# Patient Record
Sex: FEMALE
[De-identification: names, ages, dates, MRNs, and addresses within clinical notes are randomized per-mention and may not be internally consistent; named-entity substitution may affect disease eponyms.]

---

## 2017-12-18 NOTE — ED PDOC
Arrival/HPI





- General


Time Seen by Provider: 12/18/17 13:34


Historian: Patient





- History of Present Illness


Narrative History of Present Illness (Text): 





12/18/17 13:38


66yo female with Past medical history  of hypertension and vertigo biba for 

2days history of persistent dizziness. States she took her medication, but 

still dizzy. States she hears a "cracking" noise in her ears, whenever she 

bites on food. Also note rhinorrhea for 2days. Denies fever, chills, cough, 

chest pain, focal weakness, nausea, vomiting, sick contact, any other complaint.











Past Medical History





- Provider Review


Nursing Documentation Reviewed: Yes





- Infectious Disease


Hx of Infectious Diseases: None





- Tetanus Immunization


Tetanus Immunization: Unknown





- Past Medical History


Past Medical History: Non-Contributing





- Cardiac


Hx Pacemaker: No





- Neurological


Hx Paralysis: No





- HEENT


Other/Comment: missing teeth and front bottom right tooth loose





- Hematological/Oncological


Hx Blood Transfusions: No


Hx Blood Transfusion Reaction: No





- Integumentary


Other/Comment: multiple discoloration to ble





- Musculoskeletal/Rheumatological


Hx Musculoskeletal Disorders: No





- Psychiatric


Hx Emotional Abuse: No


Hx Physical Abuse: No


Hx Substance Use: No





- Past Surgical History


Past Surgical History: Non-Contributing





- Surgical History


Hx Inguinal Hernia Repair: Yes (umbilical hernia repair)


Other/Comment: stitches in chin left knee head from various childhood accidents.





- Anesthesia


Hx Anesthesia Reactions: No


Hx Malignant Hyperthermia: No





- Suicidal Assessment


Feels Threatened In Home Enviroment: No





Family/Social History





- Physician Review


Nursing Documentation Reviewed: Yes


Family/Social History: Unknown Family HX


Smoking Status: Never Smoked


Hx Alcohol Use: No


Hx Substance Use: No


Hx Substance Use Treatment: No





Allergies/Home Meds


Allergies/Adverse Reactions: 


Allergies





No Known Allergies Allergy (Verified 03/15/16 09:38)


 








Home Medications: 


 Home Meds











 Medication  Instructions  Recorded  Confirmed


 


Amlodipine Besylate [Norvasc] 5 mg PO DAILY 06/03/15 12/18/17


 


Meclizine HCl [Antivert/25] 25 mg PO TID 06/13/16 12/18/17














Review of Systems





- Physician Review


All systems were reviewed & negative as marked: Yes





- Review of Systems


Constitutional: Normal


Eyes: Normal


ENT: Rhinorrhea


Respiratory: Normal


Cardiovascular: Normal


Gastrointestinal: Normal


Genitourinary Female: Normal


Musculoskeletal: Normal


Skin: Normal


Neurological: Dizziness.  absent: Headache, Focal Weakness, Speech Changes, 

Facial Droop


Endocrine: Normal


Hemo/Lymphatic: Normal


Psychiatric: Normal





Physical Exam


Vital Signs Reviewed: Yes


Vital Signs











  Temp Pulse Resp BP Pulse Ox


 


 12/18/17 17:48   75  24  149/70  97


 


 12/18/17 13:28  98.1 F  68  18  157/75 H  98











Temperature: Afebrile


Blood Pressure: Normal


Pulse: Regular


Respiratory Rate: Normal


Appearance: Positive for: Well-Appearing, Non-Toxic, Comfortable


Pain Distress: None


Mental Status: Positive for: Alert and Oriented X 3





- Systems Exam


Head: Present: Atraumatic, Normocephalic


Pupils: Present: PERRL


Extroacular Muscles: Present: EOMI


Conjunctiva: Present: Normal


Mouth: Present: Moist Mucous Membranes


Neck: Present: Normal Range of Motion


Respiratory/Chest: Present: Clear to Auscultation, Good Air Exchange.  No: 

Respiratory Distress, Accessory Muscle Use


Cardiovascular: Present: Regular Rate and Rhythm, Normal S1, S2.  No: Murmurs


Abdomen: Present: Normal Bowel Sounds.  No: Tenderness, Distention, Peritoneal 

Signs


Back: Present: Normal Inspection


Upper Extremity: Present: Normal Inspection.  No: Cyanosis, Edema


Lower Extremity: Present: Normal Inspection.  No: Edema


Neurological: Present: GCS=15, CN II-XII Intact, Speech Normal, Motor Func 

Grossly Intact, Normal Sensory Function, Normal Cerebellar Funct, Norm Deep 

Tendon Reflexes, Gait Normal, Memory Normal, Normal 2Pt Descrimination, Other (

No focal neurological deficit)


Skin: Present: Warm, Dry, Normal Color.  No: Rashes


Psychiatric: Present: Alert, Oriented x 3, Normal Insight, Normal Concentration





Medical Decision Making


ED Course and Treatment: 





12/18/17 17:03


PT presented for stated history. she was neurologically stable in Emergency 

department. Lab was unremarkable.





Head CT ordered





EKG Sinus rhythm with fusion complexes @75bpm





Pt however continue to complain of dizziness. She however took Meclizine PTA. 

Mecline was ordered in Emergency department.





Case was DW Dr. Lu and she was placed on Tele OBS.





Head CT - 


IMPRESSION:


No acute intracranial hemorrhage or large acute infarct so far as can be seen. 





Suspect minimal chronic periventricular white matter ischemic changes





Moderate generalized volume loss.











- Lab Interpretations


Lab Results: 








 12/18/17 14:05 





 12/18/17 14:05 





 Lab Results





12/18/17 16:15: Urine Color Yellow, Urine Appearance Clear, Urine pH 7.0, Ur 

Specific Gravity 1.015, Urine Protein Negative, Urine Glucose (UA) Negative, 

Urine Ketones Trace H, Urine Blood Negative, Urine Nitrate Negative, Urine 

Bilirubin Negative, Urine Urobilinogen 0.2, Ur Leukocyte Esterase Negative


12/18/17 14:05: Sodium 140, Potassium 4.0, Chloride 107, Carbon Dioxide 23, 

Anion Gap 14, BUN 12, Creatinine 0.6 L, Est GFR ( Amer) > 60, Est GFR (

Non-Af Amer) > 60, Random Glucose 95, Calcium 9.4, Total Bilirubin 0.7, AST 29, 

ALT 46, Alkaline Phosphatase 45, Lactate Dehydrogenase 611, Total Creatine 

Kinase 95, Troponin I < 0.01, Total Protein 6.8, Albumin 4.1, Globulin 2.8, 

Albumin/Globulin Ratio 1.5


12/18/17 14:05: PT 11.1, INR 1.02, APTT 31.1


12/18/17 14:05: WBC 4.9, RBC 5.09, Hgb 14.2, Hct 43.3, MCV 85.1, MCH 27.9, MCHC 

32.8, RDW 13.8, Plt Count 189, MPV 12.1 H, Gran % 48.4 L, Lymph % (Auto) 41.8 H

, Mono % (Auto) 6.1 H, Eos % (Auto) 2.9, Baso % (Auto) 0.8, Gran # 2.37, Lymph 

# 2.1, Mono # 0.3, Eos # 0.1, Baso # 0.04











- RAD Interpretation


Radiology Orders: 








12/18/17 16:33


HEAD W/O CONTRAST [CT] Stat 














- Medication Orders


Current Medication Orders: 








Amlodipine Besylate (Norvasc)  5 mg PO DAILY RIVAS


Sodium Chloride (Sodium Chloride 0.45%)  1,000 mls @ 60 mls/hr IV .[id number] RIVAS


Meclizine HCl (Antivert)  25 mg PO TID RIVAS





Discontinued Medications





Sodium Chloride (Sodium Chloride 0.9%)  1,000 mls @ 100 mls/hr IV .Q10H STA


   Stop: 12/19/17 02:38


   Last Admin: 12/18/17 17:00  Dose: 100 mls/hr





eMAR Start Stop


 Document     12/18/17 17:00  RG  (Rec: 12/18/17 17:37  RG  [id number])


     Intravenous Solution


      Start Date                                 12/18/17


      Start Time                                 17:00


      End Date                                   12/18/17





Meclizine HCl (Antivert)  25 mg PO STAT STA


   Stop: 12/18/17 16:49


   Last Admin: 12/18/17 17:00  Dose: 25 mg











Disposition/Present on Arrival





- Present on Arrival


Any Indicators Present on Arrival: No


History of DVT/PE: No


History of Uncontrolled Diabetes: No


Urinary Catheter: No


History Surgical Site Infection Following: None





- Disposition


Have Diagnosis and Disposition been Completed?: Yes


Diagnosis: 


 Near syncope





Disposition: HOSPITALIZED


Disposition Time: 16:35


Patient Problems: 


 Current Active Problems











Problem Status Onset


 


Near syncope Acute  











Condition: FAIR

## 2017-12-18 NOTE — CT
PROCEDURE:  CT HEAD WITHOUT CONTRAST.



HISTORY:

Dizziness. 



COMPARISON:

None available. Comparison made with prior CT scan of the brain dated 

08/31/2015. 



TECHNIQUE:

Axial computed tomography images were obtained through the head/brain 

without intravenous contrast.  



Radiation dose:



Total exam DLP = 629.69 mGy-cm.



This CT exam was performed using one or more of the following dose 

reduction techniques: Automated exposure control, adjustment of the 

mA and/or kV according to patient size, and/or use of iterative 

reconstruction technique.



FINDINGS:



HEMORRHAGE:

No acute parenchymal, subarachnoid nor extra-axial hemorrhage. 



BRAIN:

. No evidence of large acute infarct however suspect minimal chronic 

periventricular white matter ischemic changes. . 



No obvious parenchymal nor extra-axial masses or collections 

identified on this noncontrast study. 



Moderate generalized volume loss. 



VENTRICLES:

No evidence of obstructive hydrocephalus. 



CALVARIUM:

There are no acute calvarial fractures.



PARANASAL SINUSES:

Visualized paranasal sinuses are well-developed and currently 

well-aerated. 



MASTOID AIR CELLS:

Mastoid air complexes are well-developed and currently well-aerated.



OTHER FINDINGS:

None.



IMPRESSION:

No acute intracranial hemorrhage or large acute infarct so far as can 

be seen. 



Suspect minimal chronic periventricular white matter ischemic changes



Moderate generalized volume loss.

## 2017-12-19 NOTE — US
PROCEDURE:  Bilateral carotid artery duplex ultrasound 



HISTORY:

Carotid stenosis dizziness



PHYSICIAN(S):  David Barr MD.



TECHNIQUE:

Duplex sonography and color-flow Doppler were used to evaluate the 

carotid bifurcations and limited segments of the vertebral arteries 

bilaterally. The exam is limited by body habitus and tortuous vessels 



FINDINGS:

There is mild smooth heterogeneous plaque noted at the carotid 

bifurcations bilaterally. The peak systolic velocity in the proximal 

right internal carotid artery is 68 cm/sec. This corresponds to a 20 

to 39% proximal right ICA stenosis. Normal systolic velocities are 

noted in the proximal right external carotid artery. There is 

antegrade flow in the right vertebral artery.



The peak systolic velocity in the proximal left internal carotid 

artery is 70 cm/sec. This corresponds to a 20 to 39% proximal left 

ICA stenosis. Normal systolic velocities are noted in the proximal 

left external carotid artery. There is antegrade flow in the left 

vertebral artery.



IMPRESSION:

1. Bilateral 20-39% proximal ICA stenoses.



2. Antegrade flow in both vertebral arteries.

## 2017-12-19 NOTE — MRI
PROCEDURE:  MRI BRAIN WITHOUT CONTRAST



HISTORY:

dizzy



COMPARISON:

Comparison is made with previous study dated 08/31/2015 



TECHNIQUE:

Multiplanar, multisequence MR images of the brain were obtained 

without intravenous contrast enhancement.



FINDINGS:



HEMORRHAGE:

None



DWI:

No evidence of an acute or early subacute infarction.



BRAIN PARENCHYMA:

No mass effect or edema. No atrophy or chronic microvascular ischemic 

changes.



VENTRICLES:

Unremarkable. No hydrocephalus.



CRANIUM:

Unremarkable.



ORBITS:

Grossly unremarkable.



PARANASAL SINUSES/MASTOIDS:

Clear



VASCULAR SYSTEM:

Skull base flow voids intact.



OTHER FINDINGS:

None. 



IMPRESSION:

Unremarkable non contrast enhanced MRI of the brain. 



No significant interval change noted since the previous study.

## 2017-12-19 NOTE — HP
HISTORY OF PRESENT ILLNESS:  I was called on to the emergency room to see

Rylee.  She has been having dizziness for about 2 days now, she is wobbly,

she tells me, it looks like she is drunk.  She is hearing a cracking noise

in her ears  whenever she bites foods, some rhinorrhea for 2 days. 

She feels drunk when she walks, dizzy, like she is going to fall down.  She

is on Antivert for vertigo already and she has hypertension, started

medication for that too.  A 67-year-old female with dizziness for 2 days,

it will not go away.  She is on medicine for dizziness and for

hypertension.  She just cannot walk straight, feels like she is going to

fall.  No pacemaker.  She has missing teeth from front and bottom tooth. 

Discoloration of both lower extremities.  She had inguinal hernia repair,

stitches in her chin, left knee, various childhood accidents led to

scarring.



FAMILY HISTORY:  Unknown family history.



SOCIAL HISTORY:  No smoker, no drinking, no drugs.



ALLERGIES:  NO KNOWN DRUG ALLERGIES.



MEDICATIONS:  She takes Antivert for dizzy and Norvasc 5 for the blood

pressure.



REVIEW OF SYSTEMS:  No acute vision or hearing changes.  No sore throat, no

neck pain.  She is dizzy, wobbly.  She turns her head too fast.  If she

walks, she feels like she is walking drunk.  No chest pain or palpitations.

No shortness of breath, coughing or wheezing.  No nausea, vomiting,

constipation or diarrhea.  No abdominal pain.  No problems urinating.  No

pain to the legs or arms.  Skin from what she can tell is intact.  No

ulcers or rashes.  She is completely dizzy.  No headache.  No focal

weakness.  No swelling.  Not depressed or anxious.



PHYSICAL EXAMINATION:

GENERAL:  Well appearing, nontoxic, and at rest, alert and oriented x3.

VITAL SIGNS:  She has a 98.1 temperature, 68 pulse, 18 respiratory rate,

157/75 blood pressure, and 98% O2 saturation on room air.

HEENT:  Head is atraumatic and normocephalic.  Extraocular muscles are

intact.  Pupils are equal and reactive to light and accommodation.  Throat

is moist.

NECK:  Supple.

HEART:  Regular rate.  Normal S1 and S2.

LUNGS:  Clear to auscultation bilaterally.  No wheezing.  No rhonchi.  No

rales.

ABDOMEN:  Soft and nontender.  Positive bowel sounds.  No guarding or

rebound.  No CVA tenderness.

EXTREMITIES:  No edema.  There is some discoloration on legs.  She can move

all 4 extremities.

NEUROLOGIC:  GCS is 15.  Cranial nerves II through XII grossly intact. 

Normal speech.  Alert and oriented x3.

SKIN:  Warm and dry.  No apparent rashes or ulcers.

ENDOCRINE:  Thyroid midline.  No palpable appreciable adenopathy.



LABORATORY DATA:  She had lots of tests.  She has urine that is clear. 

Sodium 144, potassium 4, BUN 12, creatinine 0.6, GFR is greater than 60,

sugar is 95, calcium 9.4, total bilirubin is 0.78, AST is 29, ALT is 46,

alk phos is 45, lactic dehydrogenase is 611, total creatine kinase is 95,

troponin I is less than 0.01.  Total protein is 6.0, albumin is 4.1,

globulin 2.8.  INR is 1.02.  White count 4.9, hemoglobin 14.2, hematocrit

42.3, platelets of 189.



IMPRESSION AND PLAN:  She had a CAT scan of her head.  It showed no acute

intracranial hemorrhage or large acute infarct can be seen.  Possible

minimal chronic periventricular white matter ischemic changes.  She is

going to have a consult with Neurology.  She is going to have IV fluids,

meclizine, amlodipine, carotid Doppler, brain MRI, physical therapy,and we

will see how she does.  She is in observation status.  She is here for

acute dizziness that will not be reversed.  She feels very wobbly.





__________________________________________

Kade Lu DO





DD:  12/18/2017 17:56:44

DT:  12/18/2017 23:30:21

Job # [id number]







MTDD

## 2017-12-19 NOTE — CARD
--------------- APPROVED REPORT --------------





EKG Measurement

Heart [id number]

FL [id number]

[id number]

[id number]

[id number]



<Conclusion>

Sinus rhythm 

No change

## 2017-12-19 NOTE — CON
DATE:  12/19/2017



NEUROLOGIC CONSULTATION



CHIEF COMPLAINT:  Dizziness.



HISTORY OF PRESENT ILLNESS:  A 67-year-old woman, past medical history of

hypertension, vertigo who came in with 2 days persistent dizziness and

_____ with tinnitus and finally found nauseous as well as rhinorrhea for 2

days.  She underwent MRI of the brain, which showed no intracranial

abnormality.  Carotid Doppler showed just 20% to 39% proximal ICA stenosis.

She is doing much better after she get Antivert in the hospital, has some

hydration.  Blood pressure was stable.  She was discharged home.



PAST MEDICAL HISTORY:  History of hypertension and vertigo.



FAMILY HISTORY:  Noncontributory.



MEDICATIONS:  Reviewed by nurse per reconciliation sheet.



SOCIAL HISTORY:  No illicit drug use, smoking, or EtOH abuse.



REVIEW OF SYSTEMS:  A 14-point review of systems is negative except as per

the HPI.



PHYSICAL EXAMINATION:

VITAL SIGNS:  Temperature 96.6, pulse rate 76, blood pressure 118/74,

respiratory rate 17.

GENERAL:  The patient is seen up in bed, in no acute distress.

HEENT:  Head is atraumatic and normocephalic.  PERRLA.  Extraocular muscles

are intact.

NECK:  Supple.  No JVD.

LUNGS:  Clear to auscultation.  No adventitious sounds.

HEART:  S1 and S2.  Normal rate and rhythm.  No murmurs, rubs, or gallops.

ABDOMEN:  Soft, nontender and nondistended.  Bowel sounds are present.

EXTREMITIES:  No clubbing.  No cyanosis.  Peripheral pulses are 2+ felt

bilaterally.

NEUROLOGIC:  The patient is alert and oriented to person, place, month, and

year.  Speech fluent without errors.  Cranial nerves II through XII are

intact.  Motor exam:  Moves all extremities equally.  Toes are downgoing

bilaterally.  Sensory exam:  Light touch, pinprick, proprioception,

vibration intact.  Coordination of finger-to-nose intact.  Gait is guarded.



LABORATORY DATA:  CMP and CBC is unremarkable.



ASSESSMENT AND PLAN:  This is a 67-year-old women with history of

hypertension and vertigo who presents with persistent dizziness, tinnitus,

some rhinorrhea for last few days.  MRI of the brain showed no acute

intracranial abnormality.  Carotid Doppler showed 20% to 39% proximal ICA

stenosis.  Her dizziness is more of positional vertigo.  At this time, we

would recommend:

1.  Avoid sudden movements.

2.  Vestibular therapy as an outpatient.

3.  Salt restriction diet and meclizine 25 mg p.o. t.i.d.

4.  Follow up in our neurologic office.  She is clinically stable from my

standpoint.





__________________________________________

Jomar Patton MD





DD:  12/19/2017 18:05:29

DT:  12/19/2017 19:09:05

Job # [id number]

## 2017-12-20 NOTE — DS
HISTORY OF PRESENT ILLNESS:  I am hoping to discharge her later on this

afternoon.  She is currently on IV fluids, meclizine and Norvasc.  She came

in dizzy.  As for this morning, was sitting up, eating breakfast, she is

not as dizzy.



PHYSICAL EXAMINATION:

VITAL SIGNS:  97.3 temperature, 80 pulse, 124/76 blood pressure, 18

respiratory rate, 96% O2 sat on room air.

HEENT:  Head atraumatic, normocephalic.  Throat is moist.

NECK: Supple.

HEART:  Regular rate.

LUNGS:  Clear to auscultation.

ABDOMEN:  Soft, obese.

EXTREMITIES:  No edema.



MEDICATIONS:  She is currently on Antivert, Norvasc and IV fluids.



LABORATORY DATA:  She has a 5.4 white count, 13.5 hemoglobin, 41.7

hematocrit, 186 platelets.  She has 139 sodium, potassium 3.7, BUN is 14,

creatinine is 0.7, GFR is greater than 60, sugar is 95, calcium is 8.9,

total bilirubin is 0.6, AST is 30, ALT is 30, alkaline phosphatase 39,

total protein 6.7.  Troponin I is less than 0.01.  Urine was good.



IMPRESSION:  She has a consult with Neurology, going to see what they want

to add.  Waiting for the brain MRI to come back and the carotid ultrasound.

Hopefully we will discharge her later this afternoon.





__________________________________________

Kade Lu DO





DD:  12/19/2017 9:37:26

DT:  12/19/2017 23:34:30

Job # [id number]

## 2018-01-03 NOTE — ED PDOC
Arrival/HPI





- General


Chief Complaint: Dizziness/Lightheaded


Time Seen by Provider: 01/03/18 08:53


Historian: Patient





- History of Present Illness


Narrative History of Present Illness (Text): 





01/03/18 09:00





Rylee Ivory is a 67 year old female who presents to the emergency department 

complaining of chronic dizziness for many years. Recent CT scan and MRI have 

been completed. Patient is follows by neurology and has an ENT appointment next 

week. Patient endorses that she is currently on Meclizine. No other complaints 

presented at this time.





Symptom Onset: Gradual


Symptom Course: Unchanged, Intermittent


Activities at Onset: Light


Context: Home





Past Medical History





- Provider Review


Nursing Documentation Reviewed: Yes





- Infectious Disease


Hx of Infectious Diseases: None





- Tetanus Immunization


Tetanus Immunization: Unknown





- Past Medical History


Past Medical History: Non-Contributing





- Cardiac


Hx Cardiac Disorders: Yes


Hx Hypertension: Yes





- Pulmonary


Hx Respiratory Disorders: Yes


Hx Emphysema: Yes





- Neurological


Hx Neurological Disorder: Yes


Hx Dizziness: Yes


Hx Transient Ischemic Attacks (TIA): Yes





- HEENT


Hx HEENT Disorder: No


Other/Comment: missing teeth and front bottom right tooth loose





- Renal


Hx Renal Disorder: No





- Endocrine/Metabolic


Hx Endocrine Disorders: No





- Hematological/Oncological


Hx Blood Disorders: No





- Integumentary


Hx Dermatological Disorder: Yes





- Musculoskeletal/Rheumatological


Hx Falls: No





- Gastrointestinal


Hx Gastrointestinal Disorders: No





- Genitourinary/Gynecological


Hx Genitourinary Disorders: No





- Psychiatric


Hx Psychophysiologic Disorder: No


Hx Emotional Abuse: No


Hx Physical Abuse: No


Hx Substance Use: No





- Past Surgical History


Past Surgical History: Non-Contributing





- Surgical History


Other/Comment: stitches in chin left knee head from various childhood accidents.





- Anesthesia


Hx Anesthesia Reactions: No


Hx Malignant Hyperthermia: No





- Suicidal Assessment


Feels Threatened In Home Enviroment: No





Family/Social History





- Physician Review


Nursing Documentation Reviewed: Yes


Family/Social History: No Known Family HX


Smoking Status: Never Smoked


Hx Alcohol Use: No


Hx Substance Use: No


Hx Substance Use Treatment: No





Allergies/Home Meds


Allergies/Adverse Reactions: 


Allergies





No Known Allergies Allergy (Verified 01/03/18 08:53)


 








Home Medications: 


 Home Meds











 Medication  Instructions  Recorded  Confirmed


 


Amlodipine Besylate [Norvasc] 5 mg PO DAILY 06/03/15 01/03/18


 


Meclizine HCl [Antivert/25] 25 mg PO TID 06/13/16 01/03/18














Review of Systems





- Physician Review


All systems were reviewed & negative as marked: Yes





- Review of Systems


Constitutional: absent: Fevers, Night Sweats


Eyes: absent: Vision Changes


ENT: absent: Hearing Changes


Respiratory: absent: SOB, Cough


Cardiovascular: absent: Chest Pain


Gastrointestinal: absent: Abdominal Pain


Genitourinary Female: absent: Dysuria, Frequency


Musculoskeletal: absent: Arthralgias, Back Pain


Skin: absent: Rash, Pruritis


Neurological: Dizziness


Endocrine: absent: Diaphoresis


Hemo/Lymphatic: absent: Adenopathy


Psychiatric: absent: Anxiety, Depression





Physical Exam


Vital Signs Reviewed: Yes


Vital Signs











  Temp Pulse Resp BP Pulse Ox


 


 01/03/18 11:31   81  16  136/76  98


 


 01/03/18 09:41  98.0 F  71  18  148/76  97


 


 01/03/18 08:50  98 F  79  18  139/84  98











Temperature: Afebrile


Blood Pressure: Normal


Pulse: Regular


Respiratory Rate: Normal


Appearance: Positive for: Well-Appearing, Non-Toxic, Comfortable


Pain Distress: None


Mental Status: Positive for: Alert and Oriented X 3





- Systems Exam


Head: Present: Atraumatic, Normocephalic


Pupils: Present: PERRL


Extroacular Muscles: Present: EOMI


Conjunctiva: Present: Normal


Mouth: Present: Moist Mucous Membranes


Neck: Present: Normal Range of Motion


Respiratory/Chest: Present: Clear to Auscultation, Good Air Exchange.  No: 

Respiratory Distress, Accessory Muscle Use


Cardiovascular: Present: Regular Rate and Rhythm, Normal S1, S2.  No: Murmurs


Abdomen: Present: Normal Bowel Sounds.  No: Tenderness, Distention, Peritoneal 

Signs


Back: Present: Normal Inspection


Upper Extremity: Present: Normal Inspection.  No: Cyanosis, Edema


Lower Extremity: Present: Normal Inspection.  No: Edema


Neurological: Present: GCS=15, CN II-XII Intact, Speech Normal


Skin: Present: Warm, Dry, Normal Color.  No: Rashes


Psychiatric: Present: Alert, Oriented x 3, Normal Insight, Normal Concentration





Medical Decision Making


ED Course and Treatment: 





01/03/18 08:55


Impression:





Plan:


-- Reglan and IV fluids


-- Labs


-- Reassess and disposition





Prior Visits:


Notes and results from previous visits were reviewed. Patient was last seen in 

the emergency department on 12/18/17 for 2days history of persistent dizziness. 

Patient was discharged home. 





Progress Notes:








- Lab Interpretations


Lab Results: 








 01/03/18 09:30 





 01/03/18 09:30 





 Lab Results





01/03/18 09:30: Sodium 143, Potassium 4.0, Chloride 108 H, Carbon Dioxide 24, 

Anion Gap 14, BUN 10, Creatinine 0.5 L, Est GFR ( Amer) > 60, Est GFR (

Non-Af Amer) > 60, Random Glucose 93, Calcium 9.2, Total Bilirubin 0.5, AST 28, 

ALT 33, Alkaline Phosphatase 39, Total Protein 7.0, Albumin 3.8, Globulin 3.2, 

Albumin/Globulin Ratio 1.2


01/03/18 09:30: WBC 5.4, RBC 5.14, Hgb 14.6, Hct 43.7, MCV 85.0, MCH 28.4, MCHC 

33.4, RDW 13.9, Plt Count 163, MPV 12.4 H, Gran % 49.0 L, Lymph % (Auto) 39.0 H

, Mono % (Auto) 8.7 H, Eos % (Auto) 2.6, Baso % (Auto) 0.7, Gran # 2.65, Lymph 

# 2.1, Mono # 0.5, Eos # 0.1, Baso # 0.04








I have reviewed the lab results: Yes





- Medication Orders


Current Medication Orders: 











Discontinued Medications





Sodium Chloride (Sodium Chloride 0.9%)  1,000 mls @ 100 mls/hr IV .Q10H RIVAS


   Last Admin: 01/03/18 09:45  Dose: 100 mls/hr





eMAR Start Stop


 Document     01/03/18 09:45  [facility]  (Rec: 01/03/18 09:45  [facility]  formerly [facility])


     Intravenous Solution


      Start Date                                 01/03/18


      Start Time                                 09:32


      End Date                                   01/03/18





Metoclopramide HCl (Reglan)  10 mg IVP STAT STA


   Stop: 01/03/18 09:03


   Last Admin: 01/03/18 09:45  Dose: 10 mg





IVP Administration


 Document     01/03/18 09:45  [facility]  (Rec: 01/03/18 09:45  [facility]  formerly [facility])


     Charges for Administration


      # of IVP Administrations                   1














- Scribe Statement


The provider has reviewed the documentation as recorded by the Jhoana Riggins





Provider Scribe Attestation:


All medical record entries made by the Scribe were at my direction and 

personally dictated by me. I have reviewed the chart and agree that the record 

accurately reflects my personal performance of the history, physical exam, 

medical decision making, and the department course for this patient. I have 

also personally directed, reviewed, and agree with the discharge instructions 

and disposition.





Disposition/Present on Arrival





- Present on Arrival


Any Indicators Present on Arrival: No


History of DVT/PE: No


History of Uncontrolled Diabetes: No


Urinary Catheter: No


History of Decub. Ulcer: No


History Surgical Site Infection Following: None





- Disposition


Have Diagnosis and Disposition been Completed?: Yes


Diagnosis: 


 Dizziness





Disposition: HOME/ ROUTINE


Disposition Time: 10:20


Condition: IMPROVED


Discharge Instructions (ExitCare):  Dizziness (ED)


Additional Instructions: 





Thank you for letting us take care of you today. The emergency medical care you 

received today was directed at your acute symptoms. If you were prescribed any 

medication, please fill it and take as directed. It may take several days for 

your symptoms to resolve. Return to the Emergency Department if your symptoms 

worsen, do not improve, or if you have any other problems.





Please contact your doctor or call one of the physicians/clinics you have been 

referred to that are listed on the Patient Visit Information form that is 

included in your discharge packet. Bring any paperwork you were given at 

discharge with you along with any medications you are taking to your follow up 

visit. Our treatment cannot replace ongoing medical care by a primary care 

provider (PCP) outside of the emergency department.





Thank you for allowing the [facility] team to be part of your care today.

















Follow up with the ENT doctor as scheduled and then your primary doctor.


Referrals: 


[facility], [Non-Staff] - Follow up with primary


Forms:  [employer] (English)

## 2019-02-11 ENCOUNTER — HOSPITAL ENCOUNTER (OUTPATIENT)
Dept: HOSPITAL 42 - SDS | Age: 69
Discharge: HOME | End: 2019-02-11
Attending: GENERAL PRACTICE
Payer: MEDICARE

## 2019-02-11 VITALS — OXYGEN SATURATION: 98 % | DIASTOLIC BLOOD PRESSURE: 56 MMHG | HEART RATE: 72 BPM | SYSTOLIC BLOOD PRESSURE: 132 MMHG

## 2019-02-11 VITALS — RESPIRATION RATE: 18 BRPM

## 2019-02-11 VITALS — BODY MASS INDEX: 35.2 KG/M2

## 2019-02-11 VITALS — TEMPERATURE: 97.5 F

## 2019-02-11 DIAGNOSIS — K42.9: Primary | ICD-10-CM

## 2019-02-11 DIAGNOSIS — I10: ICD-10-CM

## 2019-02-11 PROCEDURE — 49652: CPT

## 2019-02-11 RX ADMIN — HYDROMORPHONE HYDROCHLORIDE PRN MG: 1 INJECTION, SOLUTION INTRAMUSCULAR; INTRAVENOUS; SUBCUTANEOUS at 12:55

## 2019-02-11 RX ADMIN — HYDROMORPHONE HYDROCHLORIDE PRN MG: 1 INJECTION, SOLUTION INTRAMUSCULAR; INTRAVENOUS; SUBCUTANEOUS at 12:30

## 2019-02-11 NOTE — OP
PROCEDURE DATE:  02/11/2019



PREOPERATIVE DIAGNOSIS:  Painful umbilical hernia.



POSTOPERATIVE DIAGNOSIS:  Painful umbilical hernia.



PROCEDURE PERFORMED:  Laparoscopic repair of the umbilical hernia with a

9-cm Symbotex circular mesh.



SURGEON: Mario Mustafa MD



ASSISTANT:  Santo Bhardwaj DO



ANESTHESIOLOGIST:  Carmelina Tay MD



ANESTHESIA:  General endotracheal anesthesia.



ESTIMATED BLOOD LOSS:  Minimal.



SPECIMENS:  None.



INDICATION FOR PROCEDURE:  The patient is a 68-year-old female with a

history of painful lump near the umbilicus.  The patient was examined and

was noted to have a small umbilical hernia with tenderness and was

scheduled for the repair.



DESCRIPTION OF PROCEDURE:  The patient was brought to the operating room

and placed on the operating room table in supine position.  The patient was

connected to EKG, blood pressure, and pulse oximetry monitors.  The patient

then underwent general endotracheal anesthesia.  She was prepped and draped

in the usual sterile fashion.



First, a standard time-out procedure took place when everybody in the room

agreed as to the patient's identity, diagnosis, and procedure to be

performed.



Using lidocaine mixed with Marcaine, the incision was made just below the

costal margin in the midclavicular line.  Under direct visualization

through the Visiport, the scope was advanced with the port into the

abdominal cavity.  On a careful evaluation of the abdominal cavity, it was

noted that there were some smaller pieces of the omentum to the right lower

quadrant laterally as well as left upper quadrant adjacent to the site of

the 10-mm port.  At this point, we placed a second port in anterior

axillary line just above the iliac crest under direct vision.  Once this

was done, a 5-mm scope was put in through that port.  On careful

evaluation, the entry site for the 10-mm port was examined and was noted

not to injure any adjacent omentum.  At this point, we proceeded with

careful dissection of the preperitoneal fat pad from the area where the

umbilical hernia was.  The fat containing hernia was reduced.  Then, the

fat was removed.  The hernia itself was completely exposed.  It was about

1-cm sized defect.  The 9-cm Symbotex mesh was placed into the abdominal

cavity with a string attached for rubbing it and pulling it against the

abdominal wall.  The rough side was pushed against the abdominal wall, and

the smooth side was turned towards the bowel.  The mesh was then attached

with Bard tacker in two rows of the staples.  Once in place, we then

carefully released pneumoperitoneum.  The both trocar sites were examined

again.  There was no bleeding noted.  The trocars were removed.  The wounds

were closed using 0 Vicryl for the fascia, 3-0 Vicryl for the subcutaneous

tissue and 4-0 Monocryl for the skin.  Sterile Dermabond dressing was

applied to the wound.  The patient tolerated the procedure well, and there

were no complications.  The patient was then awakened and transferred to

the recovery room for further observation.







__________________________________________

Mraio Mustafa MD





DD:  02/11/2019 12:14:43

DT:  02/11/2019 15:06:16

Job # [id number]

## 2019-02-11 NOTE — PCM.SURG1
Surgeon's Initial Post Op Note





- Surgeon's Notes


Surgeon: Dr. Mustafa


Assistant: Dr. Bhardwaj PGY3


Type of Anesthesia: General Endo


Pre-Operative Diagnosis: Umbilical hernia


Operative Findings: See operative dictation


Post-Operative Diagnosis: Umbilical hernia


Operation Performed: Laparoscopic umbilical hernia repair with mesh


Specimen/Specimens Removed: Hernia fat


Estimated Blood Loss: EBL {In ML}: 5


Drains Used: No Drains


Post-Op Condition: Good


Date of Surgery/Procedure: 02/11/19


Time of Surgery/Procedure: 12:17

## 2019-02-14 ENCOUNTER — HOSPITAL ENCOUNTER (OUTPATIENT)
Dept: HOSPITAL 42 - RAD | Age: 69
End: 2019-02-14
Payer: MEDICARE

## 2019-02-14 DIAGNOSIS — M79.605: ICD-10-CM

## 2019-02-14 DIAGNOSIS — M79.89: ICD-10-CM

## 2019-02-14 DIAGNOSIS — M79.604: ICD-10-CM

## 2019-02-14 DIAGNOSIS — Z98.890: Primary | ICD-10-CM
